# Patient Record
(demographics unavailable — no encounter records)

---

## 2017-07-13 NOTE — EDM.PDOC
ED HPI GENERAL MEDICAL PROBLEM





- General


Chief Complaint: Neuro Symptoms/Deficits


Stated Complaint: NUMB SWOLLEN AND BITES ALL OVER


Time Seen by Provider: 07/13/17 01:00


Source of Information: Reports: Patient


History Limitations: Reports: No Limitations





- History of Present Illness


INITIAL COMMENTS - FREE TEXT/NARRATIVE: 





ED with c/o red spots, possibly bug bites for past week, last few days, joints 

feel achy and tonight fingers feel numb. 


Onset: Today


  ** Bilateral Leg


Pain Score (Numeric/FACES): 8





- Related Data


 Allergies











Allergy/AdvReac Type Severity Reaction Status Date / Time


 


No Known Allergies Allergy   Verified 07/13/17 00:35











Home Meds: 


 Home Meds





Levonorgestrel [Jael] 1 VAG ASDIRECTED 07/13/17 [History]











Past Medical History


Cardiovascular History: Reports: None


Respiratory History: Reports: None


Gastrointestinal History: Reports: None


Genitourinary History: Reports: None


Musculoskeletal History: Reports: None


Neurological History: Reports: None


Psychiatric History: Reports: None


Endocrine/Metabolic History: Reports: None


Hematologic History: Reports: None


Immunologic History: Reports: None


Oncologic (Cancer) History: Reports: None


Dermatologic History: Reports: None





- Infectious Disease History


Infectious Disease History: Reports: Chicken Pox





- Past Surgical History


HEENT Surgical History: Reports: Adenoidectomy, Other (See Below)





Social & Family History





- Family History


Family Medical History: Noncontributory





- Tobacco Use


Smoking Status *Q: Never Smoker


Second Hand Smoke Exposure: No





- Caffeine Use


Caffeine Use: Reports: None





- Alcohol Use


Days Per Week of Alcohol Use: 0





- Recreational Drug Use


Recreational Drug Use: No





ED ROS GENERAL





- Review of Systems


Review Of Systems: See Below


Constitutional: Reports: Malaise


HEENT: Reports: No Symptoms


Respiratory: Reports: No Symptoms


Cardiovascular: Reports: No Symptoms


Endocrine: Reports: No Symptoms


GI/Abdominal: Reports: No Symptoms


Musculoskeletal: Reports: Hand Pain


Skin: Reports: Pruritis, Rash, Change in Color


Psychiatric: Reports: No Symptoms





ED EXAM, GENERAL





- Physical Exam


Exam: See Below


Exam Limited By: No Limitations


General Appearance: Alert, Anxious, Mild Distress


Eye Exam: Bilateral Eye: EOMI


Ears: Normal External Exam, Normal TMs


Nose: Normal Inspection


Throat/Mouth: Normal Inspection


Head: Atraumatic, Normocephalic


Respiratory/Chest: No Respiratory Distress, Lungs Clear, Normal Breath Sounds


Cardiovascular: Normal Peripheral Pulses, Regular Rate, Rhythm, No Edema


GI/Abdominal: Normal Bowel Sounds, Soft


Neurological: Alert, Oriented


Psychiatric: Anxious


Skin Exam: Warm, Dry, Intact, Rash (scattered red raised papular lesions over 

back, legs and few on arms)





Course





- Vital Signs


Last Recorded V/S: 


 Last Vital Signs











Temp  96.9 F   07/13/17 04:21


 


Pulse  63   07/13/17 04:21


 


Resp  18   07/13/17 04:21


 


BP  125/59 L  07/13/17 04:21


 


Pulse Ox  99   07/13/17 04:21














- Orders/Labs/Meds


Labs: 


 Laboratory Tests











  07/13/17 07/13/17 07/13/17 Range/Units





  03:15 03:15 03:15 


 


WBC  15.0 H    (5.0-10.0)  10^3/uL


 


RBC  4.93    (4.2-5.4)  10^6/uL


 


Hgb  14.3    (12.0-16.0)  g/dL


 


Hct  42.3    (37.0-47.0)  %


 


MCV  85.8    ()  fL


 


MCH  29.0    (27.0-34.0)  pg


 


MCHC  33.8    (33.0-35.0)  g/dL


 


Plt Count  272    (150-450)  10^3/uL


 


Neut % (Auto)  75.0    (42.2-75.2)  %


 


Lymph % (Auto)  18.0 L    (20.5-50.1)  %


 


Mono % (Auto)  6.3    (2-8)  %


 


Eos % (Auto)  0.5 L    (1.0-3.0)  %


 


Baso % (Auto)  0.2    (0.0-1.0)  %


 


Sodium   136   (135-145)  mmol/L


 


Potassium   3.5 L   (3.6-5.0)  mmol/L


 


Chloride   102   (101-111)  mmol/L


 


Carbon Dioxide   22.0   (21.0-31.0)  mmol/L


 


Anion Gap   15.5   


 


BUN   14   (7-18)  mg/dL


 


Creatinine   0.7   (0.6-1.3)  mg/dL


 


Est Cr Clr Drug Dosing   115.01   mL/min


 


Estimated GFR (MDRD)   > 60   


 


Glucose   88   ()  mg/dL


 


Calcium   9.3   (8.4-10.2)  mg/dl


 


C-Reactive Protein    1.0  (0.0-1.3)  mg/dL











Meds: 


Medications














Discontinued Medications














Generic Name Dose Route Start Last Admin





  Trade Name Ilia  PRN Reason Stop Dose Admin


 


Cephalexin  500 mg  07/13/17 04:05  07/13/17 04:16





  Keflex  PO  07/13/17 04:06  500 mg





  ONETIME ONE   Administration


 


Ibuprofen  600 mg  07/13/17 04:05  07/13/17 04:16





  Motrin  PO  07/13/17 04:06  600 mg





  ONETIME ONE   Administration














Departure





- Departure


Time of Disposition: 04:01


Disposition: Home, Self-Care 01


Condition: Good


Clinical Impression: 


 Joint pain, Folliculitis








- Discharge Information


Instructions:  Cellulitis, Adult


Forms:  ED Department Discharge


Additional Instructions: 


keflex 500mg one 4 times dialy for one week


ibuprofen 600mg one every 8 hours as needed for joint pain 


recheck clinic Monday if not improving


ice to knee, may wrap with ice for comfort

## 2019-02-11 NOTE — OR
DATE:  2019

 

PREOPERATIVE DIAGNOSES:

1. 39 and 1/7 weeks' gestation.

2.  3, para 1-0-1-1.

3. Class 2 obesity.

4. History of miscarriage.

5. History of  section x1.

6. High-risk pregnancy due to previous obstetrical problem in the third

    trimester.

7. Subclinical hypothyroidism.

8. Rubella nonimmune.

9. History of genital herpes.

10.Breech presentation at last clinic appointment.

 

POSTOPERATIVE DIAGNOSES:

1. 39 and 1/7 weeks' gestation.

2.  3, para 1-0-1-1.

3. Class 2 obesity.

4. History of miscarriage.

5. History of  section x1.

6. High-risk pregnancy due to previous obstetrical problem in the third

    trimester.

7. Subclinical hypothyroidism.

8. Rubella nonimmune.

9. History of genital herpes.

10.Breech presentation at last clinic appointment.

11.Status post repeat low transverse  section without complications.

12.Baby found to be in compound neck and arm presentation.

 

BRIEF HISTORY:  A 26-year-old female admitted to the hospital with the above-

listed diagnoses, for planned repeat  for breech and due to prior

history of , not a good candidate for external cephalad version and

vaginal birth attempt.  See admission history and physical for full details.

 

CONSENT:  Before patient was brought to the operating room, she had opportunity

to have her questions answered.  After we discussed indications, risks,

benefits, and alternatives of repeat low transverse  section including

but not limited to potential for injury to internal organs and adjacent

structures including large blood vessels, nerves, veins, intestines, bladder,

fallopian tubes, ovaries, uterus, and even potential injury to the baby,

potential for complications resulting in transfer for her and/or the baby and

even potential complications including death, risk of bleeding to the point of

requiring a blood transfusion as well as its inherent risks was also discussed.

Appropriate forms were signed and can be found in the chart.

 

SURGEON: Cheryl Tran MD.



FIRST ASSISTANT:  Candy Martínez MD.

 

SECOND ASSISTANT:  Jose Hernandez, MS-III.

 

ANESTHESIA:  Spinal.

 

DETAILS OF PROCEDURE:  The patient was brought to the operating room and spinal

anesthesia obtained.  She was then laid in dorsal supine position with leftward

tilt and Tomas indwelling catheter placed.  She was prepped and draped in usual

sterile fashion with pannus taped up to be held out of the way.  A skin incision

was made at 12:11 a.m. with scalpel and carried down to the underlying fascia.

This layer was then extended bilaterally with cautery and superior fascial edge

grasped with Kochers, tented up, and rectus muscles dissected off bluntly and

with cautery.  Inferior fascial edge, treated in similar fashion.  Rectus

muscles  in midline with hemostats and peritoneal cavity entered with

blunt dissection, assisted by some cautery for tight scar tissue.  The Jose

retractor was then placed in appropriate location for uterine incision decided,

attempted to create a bladder flap.  There is really insufficient peritoneal

lining.  Therefore, uterine incision made in a low transverse location at 12:21

a.m., and the baby's head was noted to be at this location prior to the

incision.  Upon opening the uterus, baby was actually found to be in a neck down

hand compound presentation.  The baby's head was maneuvered and brought up

through the uterine incision.  Remainder of the baby delivered easily

thereafter.  Nose and mouth were bulb suctioned, and 3-vessel umbilical cord was

doubly clamped, cut, baby taken to the warmer for further evaluation.  Cord

blood sample obtained.  Placenta was simultaneously being delivered by Dr. Martínez while we were taking care of the baby.  It was noted that the edge of

the placenta was all the way down into the uterine scar itself, but not an

accreta.  Uterine cavity cleared of all clots and debris with a dry lap sponge.

Hysterotomy site closed with a running lock stitch of 0 Vicryl in the usual

fashion.  Additional figure-of-eight stitch x1 was placed and ensured

hemostasis.  This layer was irrigated and cleared of any clots and debris.

Jose retractor removed and pericolic gutters cleared of any clots and debris,

after which time, we then reinspected the hysterotomy site and it remained

hemostatic.  The peritoneal layer was closed with a remnant of 0 Vicryl in a

running fashion bringing together the rectus muscles at the inferior edge.  We

then called for another 0 Vicryl stitch to bring together the rectus muscles in

the superior aspect as the patient was having vomiting and the intestines were

trying to protrude through that peritoneal repair and there was significant

concern for potential development of hernia.  This layer was then irrigated and

cleared of any clots and debris.  Fascia was then closed with a running stitch

of 0 looped PDS in the usual fashion.  Subcutaneous tissues then dried and

cleaned and skin closed with staples.  The patient tolerated the procedure well.

 

COMPLICATIONS:  Loss of suction during closure of the peritoneal layer,

therefore, irrigating and use of sponges for drying of each layer was necessary.

 

FINDINGS:  Viable male infant, Apgar scores of eight and nine.  Weight 7 pounds

12 ounces, grams pending.

 

ESTIMATED BLOOD LOSS:  500 mL.

 

IV FLUIDS:  1200 mL crystalloid.

 

URINE OUTPUT:  600 mL clear.

 

DISPOSITION:  Mother to wait in PACU, baby will return to the nursery with the

father and will be reunited as soon as possible.

 

DD:  2019 14:00:22

DT:  2019 21:01:18

[facility]

Job #:  [id number]

MTDD

## 2019-02-13 NOTE — PN
DATE:  2019

 

SUBJECTIVE:  The patient is a 26-year-old G3, para 2-0-1-2 who is postoperative

day 1 after a planned repeat  for breech presentation and prior history

of .  Loss of suction occurred during the procedure, necessitating use

of irrigation and sponges for drying.  Otherwise, procedure was uncomplicated.

The patient has no acute events overnight.  She is not yet ambulating and just

had Tomas catheter removed this morning, but is tolerating a regular diet.  The

patient is breastfeeding and states that breastfeeding is going well, although

baby has difficulties latching to the right breast.  Her pain is well controlled

with pain medications, although she notes that she feels a greater pain on the

left side of her incision.  She is otherwise doing well and is in good spirits

for the day.

 

OBJECTIVE:  Vital Signs:  Today temperature of 97.6 Farenheit, pulse rate 83,

blood pressure 112/52, respiratory rate 18, and pulse ox 97% on room air.

General:  The patient is awake and alert, friendly, and sitting up, nursing baby

in bed.

HEENT:  Normocephalic and atraumatic.  Extraocular movements intact.  Mucous

membranes pink and moist.

Neck:  Supple with no lymphadenopathy.

Heart:  Regular rate and rhythm.  No murmurs, rubs, or gallops.

Lungs:  Clear to auscultation bilaterally.  No rhonchi or wheezing.

Abdomen:  Soft, nondistended.  Bowel sounds in all 4 quadrants.  Uterus is

palpated at the level of the umbilicus.  Incision is covered by paper tape.

Some serosanguinous drainage noted centrally, but no erythema or edema is noted.

Extremities:  No edema or erythema.  Calves are nontender to palpation.

 

LABORATORY DATA:  White blood cell count 13.5, up from 11.0 yesterday.

Hemoglobin and hematocrit are 9.1/28.1, down from 11.4/34.9 yesterday.

 

ASSESSMENT:  Ofelia is postoperative day #1 from repeat low-transverse 

section delivering a male infant at 39 weeks 1 day gestation.

1. 39 and 1/7 week gestation.

2.  3, para 2-0-1-2.

3. Class 2 obesity.

4. History of miscarriage.

5. History of  section x1.

6. High-risk pregnancy due to previous obstetrical problem in the third

    trimester.

7. Subclinical hypothyroidism.

8. Rubella non-immune.

9. History of genital herpes.

 

PLAN:

1. Routine postpartum cares.

2. Continue encourage breast feeding.

3. Encourage ambulation now that catheter has been removed.

4. We will replace dressing and place Aquacel dressing today.

5. Continue normal diet as tolerated.

6. Anticipate discharge either tomorrow or Thursday.



Patient seen and examined. Agree with note as scribed on my behalf by  
Aura Joe, MS4. -[facility] [id number].

 

DD:  2019 13:17:34

DT:  2019 13:53:17

[facility]

Job #:  [id number]

MTDBALJINDER

## 2019-02-14 NOTE — PN
DATE:  2019

 

SUBJECTIVE:  The patient is a 26-year-old G3, para 2-0-1-2, postoperative day 2

after planned repeat  section for breach presentation and prior history

of  section.  She has no acute events overnight.  She has been

ambulating, although states that she still has quite a bit of incisional pain,

particularly on the right.  She has been taking Percocet and Motrin as often as

prescribed and feels that it is not helping with the pain.  The patient is

breastfeeding, states that breastfeeding is going well although she is tired and

feels that infant has had difficulty latching today.  Has tried a nipple shield

at times with some success, but is unsure if difficulty with breastfeeding is

due to infant or simply due to her being tired.

 

OBJECTIVE:  Vital Signs:  Temperature 97.0, pulse rate 77, blood pressure

106/59, respirations 18, and pulse ox 97% on room air.

General:  Ofelia is awake and alert, but tired looking, sitting in bed, nursing

infant.

HEENT:  Normocephalic and atraumatic.  Extraocular movements intact.  Mucous

membranes pink and moist.

Neck:  Supple.  No lymphadenopathy.

Heart:  Regular rate and rhythm.  No murmurs, rubs, or gallops.

Lungs:  Clear to auscultation bilaterally.  No rhonchi or wheezing.

Abdomen:  Soft and nontender with positive bowel sounds in all 4 quadrants.

Uterus is firm at the level of the umbilicus.  Incision:  Aquacel dressing in

place.  No surrounding erythema or signs of infection.

Extremities:  Warm and dry.  No edema.  No calf tenderness.

 

ASSESSMENT:  Ofelia is postoperative day 2 from repeat low-transverse 

section, delivering a male infant at 39 weeks 1-day gestation.

1. 39 and 1/7 week gestation.

2.  3, para 2-0-1-2.

3. Class 2 obesity.

4. History of miscarriage.

5. History of  section x1.

6. High-risk pregnancy due to previous obstetrical problem in 3rd trimester.

7. Subclinical hypothyroidism.

8. Rubella non-immune.

9. History of genital herpes.

 

PLAN:

1. Continue routine postpartum cares.

2. Continue encourage breastfeeding with consultation with lactation

    consultant as needed.

3. Encourage ambulation.  Discussed taking Percocet and ambulating 30 minutes

    after taking it in order to encourage more ambulation.

4. Continue normal diet as tolerated.

5. Will need MMR vaccine at discharge.

6. Anticipate discharge tomorrow pending infant circumcision and discharge.



Patient seen and examined. Agree with note as scribed on my behalf by  
Aura Joe, MS4. -[facility] [id number].

 

DD:  2019 13:25:37

DT:  2019 15:15:11

[facility]

Job #:  [id number]

[facility]

## 2019-02-15 NOTE — DISCH
ADMITTING DIAGNOSES:

1. A 39 weeks 1-day gestation.

2.  3, para 1-0-1-1.

3. Class 2 obesity.

4. History of miscarriage.

5. History of  section x1.

6. High-risk pregnancy due to previous obstetrical problem in the third

    trimester.

7. Subclinical hypothyroidism.

8. Rubella nonimmune.

9. History of genital herpes.

10.Breech presentation.

 

DISCHARGE DIAGNOSES:

Same plus

1. Status post repeat low transverse  section with delivery of a live

    .

2. Class 2 obesity.

3. Subclinical hypothyroidism.

4. Rubella nonimmune.

 

PROCEDURE:  

1. Repeat low transverse  section due to breech presentation

and previous  section. Found to be compound presentation.

2. MMR Administration

 

CONSULTATION:  Lactation consultant.

 

HISTORY OF PRESENT ILLNESS:  Ofelia is a 26-year-old, G3, para 1-0-1-1, now

para 2-0-1-2, who presented for planned repeat  section for breech

presentation and history of prior , not a good candidate for external

cephalic version and vaginal birth attempt.  Prenatal labs included blood type A

positive, group B streptococcus negative, and rubella nonimmune.

 

HOSPITAL COURSE:  The patient was admitted for planned repeat .  During

the procedure, there was loss of suction during the closure of the peritoneal

layer, thus requiring irrigation and use of sponges for drying of each layer.

The patient tolerated the procedure well and delivered a viable male infant with

Apgar scores of 8 and 9, weighing 7 pounds 12 ounces.  Mother was taken to the

delivery floor for skin-to-skin and initiation of breast feeding.  On

postoperative day #1, the Tomas catheter was removed, and the patient was

ambulating and tolerating diet well, passing flatus, and pain well managed on

oral medications.  On postoperative day #3, the patient had a bowel movement.

The pain was well controlled, and the patient stated she was ready for

discharge.

 

PHYSICAL EXAMINATION:

Vital Signs:  Reviewed in the [facility] chart. Stable and normal

General:  Ofelia is alert and awake, standing next to infant buggy, appearing

in no distress.

HEENT:  Normocephalic and atraumatic.  Extraocular movements are intact.  Mucous

membranes are pink and moist.

Neck:  Supple.  No lymphadenopathy.

Heart:  Regular rate and rhythm.  No murmurs, rubs, or gallops.

Lungs:  Clear to auscultation bilaterally.  No rhonchi or wheezing.

Abdomen:  Bowel sounds in all 4 quadrants.  Nondistended.  Uterus is firm at the

level of the umbilicus.  Aquacel dressing is in place.  Area under pannus is

moist but is not erythematous or edematous.  No signs of infection.

Extremities:  Warm and dry.  No edema and no calf tenderness.

 

CONDITION:  Good.

 

DISPOSITION:  Home.

 

DISCHARGE MEDICATIONS:  Percocet 5/325 mg 1-2 tabs PO Q4H PRN pain #30

 

DISCHARGE INSTRUCTIONS:  The patient was advised to restrict lifting to only

lifting baby or baby in carrier.  Can continue diet as tolerated.  The patient

to call if she notices fever, redness or swelling around her wound.

 

FOLLOWUP:  The patient to schedule visit in 6 weeks to see Dr. Maldonado for a 
postpartum

check.

 

Patient seen and examined. Agree with note as scribed on my behalf by  
Aura Joe MS4. -[facility] [id number]



DD:  2019 21:35:41

DT:  02/15/2019 07:34:01

[facility]

Job #:  [id number]

MTDBALJINDER